# Patient Record
Sex: FEMALE | Race: WHITE
[De-identification: names, ages, dates, MRNs, and addresses within clinical notes are randomized per-mention and may not be internally consistent; named-entity substitution may affect disease eponyms.]

---

## 2019-08-16 ENCOUNTER — HOSPITAL ENCOUNTER (OUTPATIENT)
Dept: HOSPITAL 47 - EC | Age: 74
Setting detail: OBSERVATION
LOS: 1 days | Discharge: HOME | End: 2019-08-17
Payer: MEDICARE

## 2019-08-16 DIAGNOSIS — M54.9: ICD-10-CM

## 2019-08-16 DIAGNOSIS — R53.83: ICD-10-CM

## 2019-08-16 DIAGNOSIS — R07.89: Primary | ICD-10-CM

## 2019-08-16 DIAGNOSIS — E03.9: ICD-10-CM

## 2019-08-16 DIAGNOSIS — R06.02: ICD-10-CM

## 2019-08-16 DIAGNOSIS — Z79.890: ICD-10-CM

## 2019-08-16 DIAGNOSIS — Z79.899: ICD-10-CM

## 2019-08-16 LAB
ALBUMIN SERPL-MCNC: 4.1 G/DL (ref 3.5–5)
ALP SERPL-CCNC: 95 U/L (ref 38–126)
ALT SERPL-CCNC: 18 U/L (ref 9–52)
ANION GAP SERPL CALC-SCNC: 9 MMOL/L
APTT BLD: 24.7 SEC (ref 22–30)
AST SERPL-CCNC: 21 U/L (ref 14–36)
BASOPHILS # BLD AUTO: 0.1 K/UL (ref 0–0.2)
BASOPHILS NFR BLD AUTO: 1 %
BUN SERPL-SCNC: 14 MG/DL (ref 7–17)
CALCIUM SPEC-MCNC: 9.4 MG/DL (ref 8.4–10.2)
CHLORIDE SERPL-SCNC: 103 MMOL/L (ref 98–107)
CO2 SERPL-SCNC: 24 MMOL/L (ref 22–30)
EOSINOPHIL # BLD AUTO: 0.2 K/UL (ref 0–0.7)
EOSINOPHIL NFR BLD AUTO: 3 %
ERYTHROCYTE [DISTWIDTH] IN BLOOD BY AUTOMATED COUNT: 4.35 M/UL (ref 3.8–5.4)
ERYTHROCYTE [DISTWIDTH] IN BLOOD: 14.1 % (ref 11.5–15.5)
GLUCOSE SERPL-MCNC: 92 MG/DL (ref 74–99)
HCT VFR BLD AUTO: 41.7 % (ref 34–46)
HGB BLD-MCNC: 13.7 GM/DL (ref 11.4–16)
HYALINE CASTS UR QL AUTO: 1 /LPF (ref 0–2)
INR PPP: 0.9 (ref ?–1.2)
LYMPHOCYTES # SPEC AUTO: 2.3 K/UL (ref 1–4.8)
LYMPHOCYTES NFR SPEC AUTO: 39 %
MAGNESIUM SPEC-SCNC: 2.2 MG/DL (ref 1.6–2.3)
MCH RBC QN AUTO: 31.6 PG (ref 25–35)
MCHC RBC AUTO-ENTMCNC: 32.9 G/DL (ref 31–37)
MCV RBC AUTO: 95.9 FL (ref 80–100)
MONOCYTES # BLD AUTO: 0.4 K/UL (ref 0–1)
MONOCYTES NFR BLD AUTO: 7 %
NEUTROPHILS # BLD AUTO: 2.8 K/UL (ref 1.3–7.7)
NEUTROPHILS NFR BLD AUTO: 48 %
PH UR: 5.5 [PH] (ref 5–8)
PLATELET # BLD AUTO: 177 K/UL (ref 150–450)
POTASSIUM SERPL-SCNC: 4.1 MMOL/L (ref 3.5–5.1)
PROT SERPL-MCNC: 6.9 G/DL (ref 6.3–8.2)
PT BLD: 9.8 SEC (ref 9–12)
RBC UR QL: 2 /HPF (ref 0–5)
SODIUM SERPL-SCNC: 136 MMOL/L (ref 137–145)
SP GR UR: 1.01 (ref 1–1.03)
SQUAMOUS UR QL AUTO: 1 /HPF (ref 0–4)
UROBILINOGEN UR QL STRIP: <2 MG/DL (ref ?–2)
WBC # BLD AUTO: 5.8 K/UL (ref 3.8–10.6)

## 2019-08-16 PROCEDURE — 71275 CT ANGIOGRAPHY CHEST: CPT

## 2019-08-16 PROCEDURE — 71046 X-RAY EXAM CHEST 2 VIEWS: CPT

## 2019-08-16 PROCEDURE — 93306 TTE W/DOPPLER COMPLETE: CPT

## 2019-08-16 PROCEDURE — 84443 ASSAY THYROID STIM HORMONE: CPT

## 2019-08-16 PROCEDURE — 81001 URINALYSIS AUTO W/SCOPE: CPT

## 2019-08-16 PROCEDURE — 36415 COLL VENOUS BLD VENIPUNCTURE: CPT

## 2019-08-16 PROCEDURE — 84484 ASSAY OF TROPONIN QUANT: CPT

## 2019-08-16 PROCEDURE — 85730 THROMBOPLASTIN TIME PARTIAL: CPT

## 2019-08-16 PROCEDURE — 93005 ELECTROCARDIOGRAM TRACING: CPT

## 2019-08-16 PROCEDURE — 83735 ASSAY OF MAGNESIUM: CPT

## 2019-08-16 PROCEDURE — 99285 EMERGENCY DEPT VISIT HI MDM: CPT

## 2019-08-16 PROCEDURE — 80053 COMPREHEN METABOLIC PANEL: CPT

## 2019-08-16 PROCEDURE — 85025 COMPLETE CBC W/AUTO DIFF WBC: CPT

## 2019-08-16 PROCEDURE — 85610 PROTHROMBIN TIME: CPT

## 2019-08-16 PROCEDURE — 80061 LIPID PANEL: CPT

## 2019-08-16 NOTE — CT
EXAMINATION TYPE: CT angio chest

 

DATE OF EXAM: 8/16/2019 4:16 PM

 

COMPARISON: Chest x-ray dated 8/26/2019

 

HISTORY: SOB, chest pain

 

CT DLP: 249.7 mGycm

Automated exposure control for dose reduction was used.

 

CONTRAST: 

CTA scan of the thorax is performed with IV Contrast, patient injected with 70 mL of Isovue 370, pulm
onary embolism protocol. .  

 

FINDINGS:

 

LUNGS: Scattered areas of scarring/atelectasis in the lung bases and lateral right lower lobe. There 
is no concerning parenchymal mass or nodule identified.   There is no pleural effusion or pneumothora
x seen.  The tracheobronchial tree is patent.

 

MEDIASTINUM: There is satisfactory enhancement of the pulmonary artery and its branches, there is no 
CT evidence for pulmonary embolism.  There are no greater than 1 cm hilar or mediastinal lymph nodes.
   No pericardial effusion is seen. 

 

 

OTHER:  No additional significant abnormality is seen.

 

 

 

IMPRESSION: 

NO EVIDENCE OF PULMONARY EMBOLISM.

## 2019-08-16 NOTE — ED
Chest Pain HPI





- General


Chief Complaint: Chest Pain


Stated Complaint: Chest pain


Time Seen by Provider: 08/16/19 13:48


Source: patient


Mode of arrival: ambulatory


Limitations: no limitations





- History of Present Illness


Initial Comments: 


73-year-old female with history of hypothyroidism presenting today for chief 

complaint of heavy chest sensation occasional left arm pain and occasional mid 

back pain for the past 2-3 weeks.  Patient states that she has had a sensation 

of heavy chest with shortness of breath accompanied by occasional left arm pain 

that is sharp as well as a mid back pain. The arm pain and mid back pain is a 

stabbing sensation. She denies any specific pattern to denies increasing 

ambulation.  Patient states that she did notice just prior to the onset of the 

symptoms that she had some swelling of the left leg after line dancing.  Patient

states that she was concerned because of her friends have had a blood clot that 

she possibly had developed one in her left leg.  Patient is a lifetime 

nonsmoker.  Denies diabetes hypertension dyslipidemia.  Patient states she had 

laboratory studies drawn at her primary care provider's office approximately 2 

weeks ago.  She states she did not mention this complaint at that time.  Patient

states she feels as though every time she mind and says she has shortness of 

breath is increased from her baseline.  Patient denies dizziness, vomiting 

,abdominal pain, nausea, hemoptysis, hx of cancer, recent 

surgeries/immobilization or fractures. Patient denies history of DVT/PE. Patient

states she felt the heavy chest sensation today, but feels "fine" right now. No 

other complaints or concerns at this time. Upon arrival patient VS within 

acceptable limits. Patient appears well, no distress.








- Related Data


                                Home Medications











 Medication  Instructions  Recorded  Confirmed


 


Levothyroxine Sodium [Synthroid] 88 mcg PO DAILY 09/17/16 08/16/19


 


Cholecalciferol (Vitamin D3) 2,000 unit PO DAILY 08/16/19 08/16/19





[Vitamin D3]   


 


L.acidoph,Paracasei, B.lactis 1 cap PO DAILY 08/16/19 08/16/19





[Probiotic]   











                                    Allergies











Allergy/AdvReac Type Severity Reaction Status Date / Time


 


No Known Allergies Allergy   Verified 08/16/19 16:22














Review of Systems


ROS Statement: 


Those systems with pertinent positive or pertinent negative responses have been 

documented in the HPI.





ROS Other: All systems not noted in ROS Statement are negative.





Past Medical History


Past Medical History: Thyroid Disorder


History of Any Multi-Drug Resistant Organisms: None Reported


Past Surgical History: Orthopedic Surgery


Past Psychological History: No Psychological Hx Reported


Smoking Status: Never smoker


Past Alcohol Use History: Occasional


Past Drug Use History: None Reported





General Exam





- General Exam Comments


Initial Comments: 


General:  The patient is awake and alert, in no distress, and does not appear 

acutely ill. 


Eye:  +3 mm pupils are equal, round and reactive to light, extra-ocular movemen

ts are intact.  No nystagmus.  There is normal conjunctiva bilaterally.  No 

signs of icterus.  


Ears, nose, mouth and throat:  There are moist mucous membranes and no oral les

ions. 


Neck:  The neck is supple, there is no tenderness or JVD.  


Cardiovascular:  There is a regular rate and rhythm. No murmur, rub or gallop is

 appreciated.


Respiratory:  Lungs are clear to auscultation, respirations are non-labored, 

breath sounds are equal.  No wheezes, stridor, rales, or rhonchi.


Gastrointestinal:  Soft, non-distended, non-tender abdomen without masses or 

organomegaly noted. There is no rebound or guarding present. 


Musculoskeletal:  Normal ROM, no tenderness.  Strength 5/5. Sensation intact. 

Radial and DP pulses equal bilaterally 2+.  


Neurological:  A&O x 3. CN II-XII intact, There are no obvious motor or sensory 

deficits. Coordination appears grossly intact. Speech is normal.


Skin:  Skin is warm and dry and no rashes or lesions are noted. No LE edema.


Psychiatric:  Cooperative, appropriate mood & affect, normal judgment.  





Limitations: no limitations





Course


                                   Vital Signs











  08/16/19 08/16/19 08/16/19





  13:41 17:38 17:56


 


Temperature 97.9 F  97.9 F


 


Pulse Rate 64 57 L 57 L


 


Respiratory 18 18 18





Rate   


 


Blood Pressure 167/85 126/78 126/78


 


O2 Sat by Pulse 98 97 97





Oximetry   














Chest Pain MDM





- MDM


73-year-old female presenting for 1 month of chest pressure left arm pain and 

occasional mid back pain.  Patient denies any specific alleviating or 

aggravating factors.  Patient has no history of diabetes hypertension no known 

coronary artery disease or hyperlipidemia.  Patient is a lifelong nonsmoker.  

Patient states she has noticed increasing shortness of breath and noted some 

left leg swelling about tooth 3 weeks ago.  States has resolved.  No noted 

swelling on examination.  Normal peripheral pulse examination.  Negative Homans 

no pain to palpation of the posterior calves.  Patient's lungs are clear.  No 

concerning murmur.  Patient vital signs stable.  Chest x-ray revealed no acute 

findings.  Troponin negative.  EKG sinus bradycardia. CTA was obtained given 

history of leg swelling with SOB/CP. No PE, no noted aneursyms of the aorta. 

Patient will be admitted given advanced age for cardiology consultation and 

serial troponins as patient was experiencing chest pressure today. Patient is 

agreeable with plan. Patient continues to appears well. Patient transferred to 

floor appearing well. All questions answered to the best of my ability. Dr. Chu

 spoke with admitting provider. He is agreeable with care plan.








Disposition


Clinical Impression: 


 Chest pressure, Left arm pain, Shortness of breath





Disposition: ADMITTED AS IP TO THIS [facility]


Condition: Stable


Is patient prescribed a controlled substance at d/c from ED?: No


Time of Disposition: 16:37


Decision to Admit Reason: Admit from EC


Decision Date: 08/16/19


Decision Time: 16:37

## 2019-08-16 NOTE — XR
EXAMINATION TYPE: XR chest 2V

 

DATE OF EXAM: 8/16/2019

 

COMPARISON: NONE

 

HISTORY: Shortness of breath and chest heaviness

 

TECHNIQUE:  Frontal and lateral views of the chest are obtained.

 

FINDINGS:  There is no focal air space opacity, pleural effusion, or pneumothorax seen. Pulmonary hyp
erinflation of COPD is seen. The cardiac silhouette size is within normal limits.   The osseous struc
tures are intact.

 

IMPRESSION:  No acute cardiopulmonary process.

## 2019-08-17 VITALS — TEMPERATURE: 97.8 F | HEART RATE: 60 BPM | DIASTOLIC BLOOD PRESSURE: 58 MMHG | SYSTOLIC BLOOD PRESSURE: 98 MMHG

## 2019-08-17 VITALS — RESPIRATION RATE: 14 BRPM

## 2019-08-17 LAB
CHOLEST SERPL-MCNC: 176 MG/DL (ref ?–200)
HDLC SERPL-MCNC: 71 MG/DL (ref 40–60)
LDLC SERPL CALC-MCNC: 96 MG/DL (ref 0–99)
TRIGL SERPL-MCNC: 47 MG/DL (ref ?–150)

## 2019-08-17 NOTE — P.CRDCN
History of Present Illness


History of present illness: 


This is Kira Wallace PA-C dictating a consult on this patient


The patient was interviewed and examined by me as well as by Dr. Mondragon


Case discussed with Dr. Mondragon and he agrees with the plan of care





IMPRESSION / ASSESSMENT: 


Atypical chest discomfort, troponins negative, EKG shows no ST or T-wave 

abnormalities


History of hypothyroidism





PLAN:


2-D echo and Doppler studies to assess cardiac structure and function


If echo is normal she may go home from his cardiac standpoint and follow-up 

outpatient for further workup within 1-2 weeks


Check TSH





HPI


Patient is a 73-year-old female with a past medical history of hypothyroidism 

who presented with complaints of chest discomfort and back pain.  Patient states

it started with leg pain and swelling about a month ago after dancing.  This 

progressed intermittent back pain.  She also had chest heaviness which she 

describes as a "fullness sensation" accompanied with intermittent left arm 

tightness/discomfort.  She has felt tightness in her throat and her minimally as

well.  She has also been more short of breath and fatigued over the last month. 

She has been short of breath with activities that would typically make her short

of breath such as line dancing.  She does note that shortness of breath and 

chest discomfort gets better when she leans forward and worse when she lays on 

her back.  Denied dizziness, lightheadedness, palpitations or syncope.  Her 

symptoms have gotten progressively worse so she decided to come in for 

evaluation.  Upon admission her blood pressure was 167/85, pulse was 64.  EKG 

showed sinus mechanism with no ST or T-wave abnormalities.  Troponins negative 

3.  Her chest x-ray showed no acute cardiopulmonary process and her chest CT 

was negative for pulmonary embolism.  Patient seen and examined resting in bed. 

States she is feeling a little better.  Still complaining of upper back pain.





She denies history of hypertension, diabetes, heart disease


She has a lifelong nonsmoker, drinks 2 glasses of wine a day


Denies known family history of heart disease





ROS: 


No fevers, positive for chills


no cough, phlegm or expectoration, 


no nausea, vomiting or diarrhea, 


no hematuria, dysuria, 


Positive for back pain


no strokes or seizures, 


no skin lesions.





EXAMINATION:


Temperature 97.8F, pulse 54, respirations 16, blood pressure 130/76, oxygen 

saturation 98% on room air


Patient seen and examined resting in bed, in no acute distress


Lungs clear to auscultation bilaterally, no wheezing, rhonchi or crackles 

appreciated


Heart is regular, normal S1-S2, no murmurs appreciated


No tenderness to palpation in the anterior chest or back


No elevated JVD noted


No carotid bruits


No lower extremity edema


Abdomen soft nontender





REVIEW OF LABS, ECG & MEDICAL DATA


CBC 5.8, hemoglobin 13.7, platelets 177, potassium 4.1, BUN 14, creatinine 0.74,

magnesium 2.2


Troponin negative 3


Total cholesterol 176, triglycerides 47, LDL 96, HDL 71


Chest x-ray ray shows no acute cardiopulmonary process


Chest CT shows no evidence of pulmonary embolism


EKG showed sinus mechanism with no ST or T-wave abnormalities





Past Medical History


Past Medical History: Thyroid Disorder


History of Any Multi-Drug Resistant Organisms: None Reported


Past Surgical History: Orthopedic Surgery


Additional Past Surgical History / Comment(s): PLATE IN RIGHT FOOT AND RIGHT 

ANKLE FRACTURE WITH PLATE.


Past Psychological History: No Psychological Hx Reported


Smoking Status: Never smoker


Past Alcohol Use History: Occasional


Past Drug Use History: None Reported





Medications and Allergies


                                Home Medications











 Medication  Instructions  Recorded  Confirmed  Type


 


Levothyroxine Sodium [Synthroid] 88 mcg PO DAILY 09/17/16 08/16/19 History


 


Cholecalciferol (Vitamin D3) 2,000 unit PO DAILY 08/16/19 08/16/19 History





[Vitamin D3]    


 


L.acidoph,Paracasei, B.lactis 1 cap PO DAILY 08/16/19 08/16/19 History





[Probiotic]    








                                    Allergies











Allergy/AdvReac Type Severity Reaction Status Date / Time


 


No Known Allergies Allergy   Verified 08/16/19 16:22














Physical Exam


Vitals: 


                                   Vital Signs











  Temp Pulse Pulse Resp BP BP Pulse Ox


 


 08/17/19 04:00    54 L  16   


 


 08/17/19 03:30  97.8 F   54 L  16   138/76  98


 


 08/16/19 23:59    57 L  15   


 


 08/16/19 23:12  97.5 F L   57 L  15   124/69  98


 


 08/16/19 19:52    65  15   


 


 08/16/19 18:37  97.8 F   65  15   128/70  96


 


 08/16/19 18:15  98.2 F   54 L  18   138/78  99


 


 08/16/19 17:56  97.9 F  57 L   18  126/78   97


 


 08/16/19 17:38   57 L   18  126/78   97


 


 08/16/19 13:41  97.9 F  64   18  167/85   98








                                Intake and Output











 08/16/19 08/17/19 08/17/19





 22:59 06:59 14:59


 


Other:   


 


  # Voids  1 














Results





                                 08/16/19 14:58





                                 08/16/19 14:58


                                 Cardiac Enzymes











  08/16/19 08/16/19 08/16/19 Range/Units





  14:58 14:58 20:56 


 


AST  21    (14-36)  U/L


 


Troponin I   <0.012  <0.012  (0.000-0.034)  ng/mL














  08/17/19 Range/Units





  02:47 


 


AST   (14-36)  U/L


 


Troponin I  <0.012  (0.000-0.034)  ng/mL








                                   Coagulation











  08/16/19 Range/Units





  14:58 


 


PT  9.8  (9.0-12.0)  sec


 


APTT  24.7  (22.0-30.0)  sec








                                     Lipids











  08/17/19 Range/Units





  02:47 


 


Triglycerides  47  (<150)  mg/dL


 


Cholesterol  176  (<200)  mg/dL


 


HDL Cholesterol  71 H  (40-60)  mg/dL








                                       CBC











  08/16/19 Range/Units





  14:58 


 


WBC  5.8  (3.8-10.6)  k/uL


 


RBC  4.35  (3.80-5.40)  m/uL


 


Hgb  13.7  (11.4-16.0)  gm/dL


 


Hct  41.7  (34.0-46.0)  %


 


Plt Count  177  (150-450)  k/uL








                          Comprehensive Metabolic Panel











  08/16/19 Range/Units





  14:58 


 


Sodium  136 L  (137-145)  mmol/L


 


Potassium  4.1  (3.5-5.1)  mmol/L


 


Chloride  103  ()  mmol/L


 


Carbon Dioxide  24  (22-30)  mmol/L


 


BUN  14  (7-17)  mg/dL


 


Creatinine  0.74  (0.52-1.04)  mg/dL


 


Glucose  92  (74-99)  mg/dL


 


Calcium  9.4  (8.4-10.2)  mg/dL


 


AST  21  (14-36)  U/L


 


ALT  18  (9-52)  U/L


 


Alkaline Phosphatase  95  ()  U/L


 


Total Protein  6.9  (6.3-8.2)  g/dL


 


Albumin  4.1  (3.5-5.0)  g/dL








                               Current Medications











Generic Name Dose Route Start Last Admin





  Trade Name Freq  PRN Reason Stop Dose Admin


 


Aspirin  325 mg  08/17/19 09:00 





  Aspirin  PO  





  DAILY [facility]  


 


Cholecalciferol  2,000 unit  08/17/19 09:00 





  Vitamin D3 (25 Mcg = 1000 Iu)  PO  





  DAILY [facility]  


 


Lactobacillus Acidoph/Bulgaricus  1 each  08/17/19 09:00 





  Lactinex  PO  





  DAILY [facility]  


 


Levothyroxine Sodium  88 mcg  08/17/19 06:30  08/17/19 06:30





  Synthroid  PO   88 mcg





  DAILY@0630 [facility]   Administration


 


Nitroglycerin  0.4 mg  08/16/19 16:35 





  Nitrostat  SUBLINGUAL  





  Q5M PRN  





  Chest Pain  








                                Intake and Output











 08/16/19 08/17/19 08/17/19





 22:59 06:59 14:59


 


Other:   


 


  # Voids  1 








                                        





                                 08/16/19 14:58 





                                 08/16/19 14:58

## 2019-08-17 NOTE — P.DS
Providers


Date of admission: 


08/16/19 17:37





Expected date of discharge: 08/17/19


Attending physician: 


David Baptiste





Consults: 





                                        





08/16/19 16:35


Consult Physician Routine 


   Consulting Provider: Kamran Pike


   Consult Reason/Comments: chest pressure


   Do you want consulting provider notified?: Yes











Primary care physician: 


David Baptiste





[facility] Course: 





Chief Complaint: Chest pain


Mrs. Odell is a 73-year-old female with a past medical history of 

hypothyroidism coming in with a chief complaint of left sided chest pain that 

has been going on and off for the past 2-3 weeks.  Patient states that she has a

sensation of heaviness in the left side of the chest associated with some left 

arm heaviness and also radiating to the mid back.  Patient denies having any 

weakness of her left upper extremity.  Patient denies having any associated 

diaphoresis with the chest pain.  But she was anxious about this chest pain and 

was having mild difficulty in breathing when she thinks about it.  She has been 

discussing about her symptoms with her friends and they were concerned about a 

blood clot in her lungs or her left leg as she felt some heaviness in her left 

leg as well.  Patient denies having any recent travel or recent surgeries done. 

Her primary care physician pauly lisa she states that she was seen in 

his office few months back and everything was normal.  Patient denies having any

history of smoking.  No family history of cardiac issues.


Patient denies having any fevers chills or rigors.  No cough or sputum.  No 

abdominal pain nausea vomiting or diarrhea.  No dysuria or hematuria.  No 

headache or neck stiffness.  No facial droop or slurring of speech.  No weakness

of her extremities.  No gait changes.








In the emergency patient had a CT of the chest that was negative for PE.  Chest 

x-ray was done with was negative for any cardiopulmonary process.  EKG within 

normal limits.  Troponins 3 have been within normal limits.  UA within normal 

limits.  She was evaluated by cardiologist this morning, she had an echo done 

that was reported to be within normal limits.  The final report of the echo is 

still pending, but preliminary report by Dr. Farah within normal limits.  So 

the patient has been cleared by cardiology to be discharged home in a stable 

condition.





DISCHARGE DIAGNOSIS





Atypical chest pain


Hypothyroidism





PLAN: Patient is admitted for ACS rule out, serial troponins and EKGs within 

normal limits.  Patient had an echo done that was within normal limits.  She has

been cleared by cardiology to be discharged home in a stable condition.  She is 

advised to follow with Dr. Baptiste her PCP within 3-5 days.


Patient Condition at Discharge: Stable





Plan - Discharge Summary


Discharge Rx Participant: No


New Discharge Prescriptions: 


Continue


   Levothyroxine Sodium [Synthroid] 88 mcg PO DAILY


   L.acidoph,Paracasei, B.lactis [Probiotic] 1 cap PO DAILY


   Cholecalciferol (Vitamin D3) [Vitamin D3] 2,000 unit PO DAILY


Discharge Medication List





Levothyroxine Sodium [Synthroid] 88 mcg PO DAILY 09/17/16 [History]


Cholecalciferol (Vitamin D3) [Vitamin D3] 2,000 unit PO DAILY 08/16/19 [History]


L.acidoph,Paracasei, B.lactis [Probiotic] 1 cap PO DAILY 08/16/19 [History]








Follow up Appointment(s)/Referral(s): 


Олег Mondragon MD [STAFF PHYSICIAN] - 1 Week (follow up with Dr. Mondragon/Kira Wallace/Kym Del Castillo in 1-2 weeks)


David Baptiste DO [Primary Care Provider] - 1-2 days

## 2019-08-17 NOTE — P.HPIM
History of Present Illness


H&P Date: 08/17/19


Chief Complaint: Chest pain


Mrs. Odell is a 73-year-old female with a past medical history of 

hypothyroidism coming in with a chief complaint of left sided chest pain that 

has been going on and off for the past 2-3 weeks.  Patient states that she has a

sensation of heaviness in the left side of the chest associated with some left 

arm heaviness and also radiating to the mid back.  Patient denies having any 

weakness of her left upper extremity.  Patient denies having any associated 

diaphoresis with the chest pain.  But she was anxious about this chest pain and 

was having mild difficulty in breathing when she thinks about it.  She has been 

discussing about her symptoms with her friends and they were concerned about a 

blood clot in her lungs or her left leg as she felt some heaviness in her left 

leg as well.  Patient denies having any recent travel or recent surgeries done. 

Her primary care physician pauly lisa she states that she was seen in 

his office few months back and everything was normal.  Patient denies having any

history of smoking.  No family history of cardiac issues.





Patient denies having any fevers chills or rigors.  No cough or sputum.  No 

abdominal pain nausea vomiting or diarrhea.  No dysuria or hematuria.  No 

headache or neck stiffness.  No facial droop or slurring of speech.  No weakness

of her extremities.  No gait changes.








In the emergency patient had a CT of the chest that was negative for PE.  Chest 

x-ray was done with was negative for any cardiopulmonary process.  EKG within 

normal limits.  Troponins 3 have been within normal limits.  UA within normal 

limits.  She was evaluated by cardiologist this morning, she had an echo done 

that was reported to be within normal limits.  The final report of the echo is 

still pending, but preliminary report by Dr. Farah within normal limits.  So 

the patient has been cleared by cardiology to be discharged home in a stable 

condition.





Review of Systems





REVIEW OF SYSTEMS:


PSYCH: No anxiety or depression


NEURO:No c/o weakness of the extremties, No facial droop, No speech 

abnormalities.


VASCULAR: Peripheral nervous system within the normal limits no edema


HEMATOLOGIC: No history of easy bleeding and bruising .  No recent infections .


RESPIRATORY: No cough, No SOB, No chest discomfort. 


IMMUNE: No infections


INTEGUMENT: no rashes


OPHTHALMOLOGIC: No blurry vision and no eye discharge


: No dysuria or hematuria


GYN: No bleeding PV


CARDIAC: No chest pain , shortness of breath , paroxysmal nocturnal dyspnea


MUSCULOSKELETAL : No Aches or pains in the joints or muscles. 


GI: No abdominal pain, Nausea or vomiting. No constipation or diarrhea. 





All 13 review of systems are negative except for the ones mentioned above





Past Medical History


Past Medical History: Thyroid Disorder


History of Any Multi-Drug Resistant Organisms: None Reported


Past Surgical History: Orthopedic Surgery


Additional Past Surgical History / Comment(s): PLATE IN RIGHT FOOT AND RIGHT 

ANKLE FRACTURE WITH PLATE.


Past Psychological History: No Psychological Hx Reported


Smoking Status: Never smoker


Past Alcohol Use History: Occasional


Past Drug Use History: None Reported





Medications and Allergies


                                Home Medications











 Medication  Instructions  Recorded  Confirmed  Type


 


Levothyroxine Sodium [Synthroid] 88 mcg PO DAILY 09/17/16 08/16/19 History


 


Cholecalciferol (Vitamin D3) 2,000 unit PO DAILY 08/16/19 08/16/19 History





[Vitamin D3]    


 


L.acidoph,Paracasei, B.lactis 1 cap PO DAILY 08/16/19 08/16/19 History





[Probiotic]    








                                    Allergies











Allergy/AdvReac Type Severity Reaction Status Date / Time


 


No Known Allergies Allergy   Verified 08/16/19 16:22














Physical Exam


Vitals: 


                                   Vital Signs











  Temp Pulse Resp BP Pulse Ox


 


 08/17/19 17:05      98


 


 08/17/19 12:26  97.8 F  60  14  98/58 


 


 08/17/19 12:00    14  


 


 08/17/19 09:42  97.7 F  95  14  170/93 


 


 08/17/19 04:00   54 L  16  


 


 08/17/19 03:30  97.8 F  54 L  16  138/76  98


 


 08/16/19 23:59   57 L  15  


 


 08/16/19 23:12  97.5 F L  57 L  15  124/69  98


 


 08/16/19 19:52   65  15  


 


 08/16/19 18:37  97.8 F  65  15  128/70  96








                                Intake and Output











 08/17/19 08/17/19 08/17/19





 06:59 14:59 22:59


 


Other:   


 


  Voiding Method  Toilet 


 


  # Voids 1 1 














GEN. APPEARANCE: alert, in no apparent distress


HEENT: No pallor no icterus.  Mucous membranes moist.  No thyromegaly.  No 

lymphadenopathy.


RESPIRATORY EXAM:  normal lung sounds bilaterally.  Absent: respiratory 

distress, wheezes, rales, rhonchi, stridor


CARDIOVASCULAR EXAM:  regular rate, normal rhythm, normal heart sounds.  Absent:

 systolic murmur, diastolic murmur, rubs, gallop, clicks


GI/ABDOMINAL EXAM: soft, normal bowel sounds.  Absent: distended, tenderness, 

guarding, rebound, rigid


EXTREMITIES EXAM: No pedal edema


NEUROLOGICAL EXAM:  alert, oriented X3, no focal deficits


PSYCHIATRIC EXAM:  normal affect, normal mood


SKIN EXAM:  warm, dry, intact, normal color.  Absent: rash





Results


CBC & Chem 7: 


                                 08/16/19 14:58





                                 08/16/19 14:58


Labs: 


                  Abnormal Lab Results - Last 24 Hours (Table)











  08/17/19 Range/Units





  02:47 


 


HDL Cholesterol  71 H  (40-60)  mg/dL














Thrombosis Risk Factor Assmnt





- Choose All That Apply


Each Risk Factor Represents 2 Points: Age 61-74 years


Thrombosis Risk Factor Assessment Total Risk Factor Score: 2


Thrombosis Risk Factor Assessment Level: Low Risk





Assessment and Plan


Assessment: 





ASSESSMENT





Atypical chest pain


Hypothyroidism





PLAN: Patient is admitted for ACS rule out, serial troponins and EKGs within 

normal limits.  Patient had an echo done that was within normal limits.  She has

 been cleared by cardiology to be discharged home in a stable condition.  She is

 advised to follow with Dr. Baptiste her PCP within 3-5 days.

## 2019-08-18 NOTE — ECHOF
Referral Reason:sob



MEASUREMENTS

--------

HEIGHT: 170.2 cm

WEIGHT: 72.6 kg

BP: 

IVSd:   1.0 cm     (0.6 - 1.1)

LVIDd:   3.7 cm     (3.9 - 5.3)

LVPWd:   1.2 cm     (0.6 - 1.1)

IVSs:   1.7 cm

LVIDs:   1.7 cm

LVPWs:   1.8 cm

RVIDd:   2.5 cm     (< 3.3)

Ao Diam:   2.8 cm     (2.0 - 3.7)

LA Diam:   2.4 cm     (2.7 - 3.8)

AV Cusp:   1.7 cm     (1.5 - 2.6)

EPSS:   0.4 cm

MV E Victoriano:   0.64 m/s

MV DecT:   194 ms

MV A Victoriano:   0.66 m/s

MV E/A Ratio:   0.98 

AR PHT:   664 ms

RAP:   5.00 mmHg

RVSP:   8.79 mmHg

MV EF SLOPE:   54.16 mm/s     (70 - 150)

MV EXCURSION:   13.19 mm     (> 18.000)







FINDINGS

--------

Sinus rhythm.

This was a technically difficult study with suboptimal views.

The left ventricular size is normal.   There is borderline concentric left ventricular hypertrophy.  
 Overall left ventricular systolic function is normal with, an EF between 55 - 60 %.

The right ventricle is normal in size.

The left atrial size is normal.

The right atrial size is normal.

5.0mg of Lumason was utilized for enhancement of images

Interatrial and interventricular septum intact.

The aortic valve is trileaflet and appears structurally normal.   Trace amount of aortic regurgitatio
n.

The mitral valve is normal.   There is trace mitral regurgitation.

Trace tricuspid regurgitation present.   Right ventricular systolic pressure is normal at < 35 mmHg.

There is no pulmonic regurgitation present.

The aortic root size is normal.

IVC Not well visulized.

There is no pericardial effusion.



CONCLUSIONS

--------

1. Sinus rhythm.

2. This was a technically difficult study with suboptimal views.

3. The left ventricular size is normal.

4. There is borderline concentric left ventricular hypertrophy.

5. Overall left ventricular systolic function is normal with, an EF between 55 - 60 %.

6. The right ventricle is normal in size.

7. The left atrial size is normal.

8. The right atrial size is normal.

9. 5.0mg of Lumason was utilized for enhancement of images

10. Interatrial and interventricular septum intact.

11. The aortic valve is trileaflet and appears structurally normal.

12. Trace amount of aortic regurgitation.

13. The mitral valve is normal.

14. There is trace mitral regurgitation.

15. Trace tricuspid regurgitation present.

16. Right ventricular systolic pressure is normal at < 35 mmHg.

17. There is no pulmonic regurgitation present.

18. The aortic root size is normal.

19. IVC Not well visulized.

20. There is no pericardial effusion.





SONOGRAPHER: Sushila Vanegas RDCS

## 2021-01-04 ENCOUNTER — HOSPITAL ENCOUNTER (OUTPATIENT)
Dept: HOSPITAL 47 - RADCTMAIN | Age: 76
Discharge: HOME | End: 2021-01-04
Attending: FAMILY MEDICINE
Payer: MEDICARE

## 2021-01-04 DIAGNOSIS — K57.30: Primary | ICD-10-CM

## 2021-01-04 PROCEDURE — 74176 CT ABD & PELVIS W/O CONTRAST: CPT

## 2021-01-05 NOTE — CT
EXAMINATION TYPE: CT abdomen pelvis wo con

 

DATE OF EXAM: 1/4/2021

 

COMPARISON: 7/7/2012

 

HISTORY: abdominal pain, hx of diverticulitis

 

CT DLP: 446.5 mGycm

Automated exposure control for dose reduction was used.

 

Images were obtained from the diaphragm to the floor the pelvis with oral contrast only.

 

Lung bases are clear of consolidation. There is no pleural effusion. Heart size is normal. There is n
o pericardial effusion.

 

Liver spleen stomach pancreas gallbladder appear normal. Bile ducts are not dilated. Stomach appears 
intact.

 

There is no adrenal mass. Kidneys have normal size. There is no hydronephrosis. Ureters are not dilat
ed. There is no retroperitoneal adenopathy. Bladder distends smoothly. There is no inguinal hernia. T
he uterus is anteverted. There is no free fluid in the pelvis.

 

There are numerous diverticula in the sigmoid colon. I see no sign of diverticulitis. There is contra
st-filled posterior appendix appears normal. The oral contrast extends down to the rectum. There is n
o evidence of a bowel obstruction. There is no mesenteric edema. There is no ascites or free air.

 

Lumbar vertebra have fairly normal alignment. There is no compression fracture. There is multilevel d
egenerative disc space narrowing. The bony pelvis is intact. Hip joints are intact. Sacrum is intact.


 

IMPRESSION:

Sigmoid diverticulosis without diverticulitis. Normal appendix. Diverticulosis is significantly worse
 than old exam.